# Patient Record
Sex: MALE | URBAN - METROPOLITAN AREA
[De-identification: names, ages, dates, MRNs, and addresses within clinical notes are randomized per-mention and may not be internally consistent; named-entity substitution may affect disease eponyms.]

---

## 2019-08-02 ENCOUNTER — OFFICE VISIT (OUTPATIENT)
Dept: URBAN - METROPOLITAN AREA CLINIC 33 | Facility: CLINIC | Age: 79
End: 2019-08-02
Payer: COMMERCIAL

## 2019-08-02 DIAGNOSIS — E11.9 TYPE 2 DIABETES MELLITUS W/O COMPLICATION: Primary | ICD-10-CM

## 2019-08-02 PROCEDURE — 99204 OFFICE O/P NEW MOD 45 MIN: CPT | Performed by: OPHTHALMOLOGY

## 2019-08-02 ASSESSMENT — KERATOMETRY
OS: 45.25
OD: 45.38

## 2019-08-02 ASSESSMENT — INTRAOCULAR PRESSURE
OD: 12
OS: 14

## 2019-08-02 ASSESSMENT — VISUAL ACUITY
OS: 20/20
OD: 20/20

## 2019-08-02 NOTE — IMPRESSION/PLAN
Impression: Type 2 diabetes mellitus w/o complication: O00.5. Condition: will continue to monitor. Symptoms: will continue to monitor. Vision: vision not threatened. Plan: Discussed diagnosis in detail with patient. No treatment is required at this time. Discussed risks of progression. Emphasized blood sugar control. Will continue to observe condition and or symptoms. Call if [address] worsens.